# Patient Record
Sex: MALE | Race: WHITE
[De-identification: names, ages, dates, MRNs, and addresses within clinical notes are randomized per-mention and may not be internally consistent; named-entity substitution may affect disease eponyms.]

---

## 2019-10-26 ENCOUNTER — HOSPITAL ENCOUNTER (EMERGENCY)
Dept: HOSPITAL 26 - MED | Age: 24
Discharge: LEFT BEFORE DISCHARGE | End: 2019-10-26
Payer: COMMERCIAL

## 2019-10-26 DIAGNOSIS — Z53.21: Primary | ICD-10-CM

## 2019-10-26 NOTE — NUR
During triage, pt states the only reason why he wants to be seen here is to 
obtain a flu shot. Informed patient we are unable to administer flu vaccine to 
him here and patient decided to leave without being triaged.